# Patient Record
Sex: FEMALE | Race: BLACK OR AFRICAN AMERICAN | NOT HISPANIC OR LATINO | Employment: OTHER | RURAL
[De-identification: names, ages, dates, MRNs, and addresses within clinical notes are randomized per-mention and may not be internally consistent; named-entity substitution may affect disease eponyms.]

---

## 2021-11-19 DIAGNOSIS — M54.50 LOW BACK PAIN, UNSPECIFIED BACK PAIN LATERALITY, UNSPECIFIED CHRONICITY, UNSPECIFIED WHETHER SCIATICA PRESENT: Primary | ICD-10-CM

## 2021-11-22 ENCOUNTER — OFFICE VISIT (OUTPATIENT)
Dept: SPINE | Facility: CLINIC | Age: 76
End: 2021-11-22
Payer: MEDICARE

## 2021-11-22 ENCOUNTER — HOSPITAL ENCOUNTER (OUTPATIENT)
Dept: RADIOLOGY | Facility: HOSPITAL | Age: 76
Discharge: HOME | End: 2021-11-22
Attending: ORTHOPAEDIC SURGERY
Payer: MEDICARE

## 2021-11-22 VITALS — HEIGHT: 61 IN | BODY MASS INDEX: 21.71 KG/M2 | WEIGHT: 115 LBS

## 2021-11-22 DIAGNOSIS — M54.16 LUMBAR RADICULOPATHY, CHRONIC: ICD-10-CM

## 2021-11-22 DIAGNOSIS — M54.50 LOW BACK PAIN, UNSPECIFIED BACK PAIN LATERALITY, UNSPECIFIED CHRONICITY, UNSPECIFIED WHETHER SCIATICA PRESENT: ICD-10-CM

## 2021-11-22 PROCEDURE — 72110 X-RAY EXAM L-2 SPINE 4/>VWS: CPT | Mod: TC

## 2021-11-22 PROCEDURE — 99204 OFFICE O/P NEW MOD 45 MIN: CPT | Mod: S$PBB,,, | Performed by: ORTHOPAEDIC SURGERY

## 2021-11-22 PROCEDURE — 99204 PR OFFICE/OUTPT VISIT, NEW, LEVL IV, 45-59 MIN: ICD-10-PCS | Mod: S$PBB,,, | Performed by: ORTHOPAEDIC SURGERY

## 2021-11-22 PROCEDURE — 72110 X-RAY EXAM L-2 SPINE 4/>VWS: CPT | Mod: 26,,, | Performed by: ORTHOPAEDIC SURGERY

## 2021-11-22 PROCEDURE — 72110 XR LUMBAR SPINE 5 VIEW WITH FLEX AND EXT: ICD-10-PCS | Mod: 26,,, | Performed by: ORTHOPAEDIC SURGERY

## 2021-11-22 PROCEDURE — 99213 OFFICE O/P EST LOW 20 MIN: CPT | Mod: PBBFAC | Performed by: ORTHOPAEDIC SURGERY

## 2021-11-22 RX ORDER — TRAZODONE HYDROCHLORIDE 50 MG/1
TABLET ORAL
COMMUNITY
Start: 2021-11-13

## 2021-11-22 RX ORDER — ASPIRIN 81 MG/1
81 TABLET ORAL DAILY
COMMUNITY

## 2021-11-22 RX ORDER — DICLOFENAC SODIUM 10 MG/G
GEL TOPICAL
COMMUNITY
Start: 2021-10-16

## 2021-11-22 RX ORDER — IRBESARTAN AND HYDROCHLOROTHIAZIDE 300; 12.5 MG/1; MG/1
TABLET, FILM COATED ORAL
COMMUNITY
Start: 2021-09-13

## 2021-11-22 RX ORDER — LINAGLIPTIN 5 MG/1
TABLET, FILM COATED ORAL
COMMUNITY
Start: 2021-08-11 | End: 2022-03-16 | Stop reason: SDUPTHER

## 2021-11-22 RX ORDER — TIZANIDINE 4 MG/1
TABLET ORAL
COMMUNITY

## 2021-11-22 RX ORDER — METFORMIN HYDROCHLORIDE 500 MG/1
TABLET ORAL
COMMUNITY
Start: 2021-09-07 | End: 2022-03-16 | Stop reason: SDUPTHER

## 2021-11-22 RX ORDER — GABAPENTIN 600 MG/1
TABLET ORAL
COMMUNITY
Start: 2021-09-14

## 2021-11-22 RX ORDER — POTASSIUM CHLORIDE 20 MEQ/1
TABLET, EXTENDED RELEASE ORAL
COMMUNITY
Start: 2021-11-12

## 2021-11-22 RX ORDER — CLOBETASOL PROPIONATE 0.5 MG/G
OINTMENT TOPICAL
COMMUNITY
Start: 2021-11-15

## 2021-11-22 RX ORDER — PANTOPRAZOLE SODIUM 40 MG/1
TABLET, DELAYED RELEASE ORAL
COMMUNITY
Start: 2021-08-26

## 2021-11-22 RX ORDER — GLIPIZIDE 10 MG/1
TABLET, FILM COATED, EXTENDED RELEASE ORAL
COMMUNITY
Start: 2021-11-12

## 2021-11-22 RX ORDER — AMLODIPINE BESYLATE 5 MG/1
TABLET ORAL
COMMUNITY
Start: 2021-08-26 | End: 2022-03-16

## 2021-11-22 RX ORDER — SIMVASTATIN 20 MG/1
TABLET, FILM COATED ORAL
COMMUNITY
Start: 2021-08-05

## 2021-11-23 DIAGNOSIS — M54.16 LUMBAR RADICULOPATHY, CHRONIC: ICD-10-CM

## 2022-03-02 ENCOUNTER — OFFICE VISIT (OUTPATIENT)
Dept: FAMILY MEDICINE | Facility: CLINIC | Age: 77
End: 2022-03-02
Payer: MEDICARE

## 2022-03-02 VITALS
DIASTOLIC BLOOD PRESSURE: 80 MMHG | HEIGHT: 61 IN | WEIGHT: 112 LBS | SYSTOLIC BLOOD PRESSURE: 171 MMHG | RESPIRATION RATE: 18 BRPM | TEMPERATURE: 98 F | HEART RATE: 85 BPM | BODY MASS INDEX: 21.14 KG/M2 | OXYGEN SATURATION: 99 %

## 2022-03-02 DIAGNOSIS — I10 PRIMARY HYPERTENSION: Primary | ICD-10-CM

## 2022-03-02 DIAGNOSIS — K21.9 GASTROESOPHAGEAL REFLUX DISEASE WITHOUT ESOPHAGITIS: ICD-10-CM

## 2022-03-02 DIAGNOSIS — E11.9 TYPE 2 DIABETES MELLITUS WITHOUT COMPLICATION, WITH LONG-TERM CURRENT USE OF INSULIN: ICD-10-CM

## 2022-03-02 DIAGNOSIS — Z79.4 TYPE 2 DIABETES MELLITUS WITHOUT COMPLICATION, WITH LONG-TERM CURRENT USE OF INSULIN: ICD-10-CM

## 2022-03-02 DIAGNOSIS — E78.49 OTHER HYPERLIPIDEMIA: ICD-10-CM

## 2022-03-02 DIAGNOSIS — M17.0 PRIMARY OSTEOARTHRITIS OF BOTH KNEES: ICD-10-CM

## 2022-03-02 DIAGNOSIS — M79.7 FIBROMYALGIA: ICD-10-CM

## 2022-03-02 LAB
ANION GAP SERPL CALCULATED.3IONS-SCNC: 7 MMOL/L (ref 7–16)
BUN SERPL-MCNC: 22 MG/DL (ref 7–18)
BUN/CREAT SERPL: 24 (ref 6–20)
CALCIUM SERPL-MCNC: 10.3 MG/DL (ref 8.5–10.1)
CHLORIDE SERPL-SCNC: 106 MMOL/L (ref 98–107)
CO2 SERPL-SCNC: 32 MMOL/L (ref 21–32)
CREAT SERPL-MCNC: 0.9 MG/DL (ref 0.55–1.02)
CREAT UR-MCNC: 70 MG/DL (ref 28–219)
EST. AVERAGE GLUCOSE BLD GHB EST-MCNC: 124 MG/DL
GLUCOSE SERPL-MCNC: 130 MG/DL (ref 74–106)
HBA1C MFR BLD HPLC: 6.3 % (ref 4.5–6.6)
MICROALBUMIN UR-MCNC: 48 MG/DL (ref 0–2.8)
MICROALBUMIN/CREAT RATIO PNL UR: 685.7 MG/G (ref 0–30)
POTASSIUM SERPL-SCNC: 4.4 MMOL/L (ref 3.5–5.1)
PROT UR-MCNC: 64.9 MG/DL (ref 0–11.9)
SODIUM SERPL-SCNC: 141 MMOL/L (ref 136–145)

## 2022-03-02 PROCEDURE — 82570 ASSAY OF URINE CREATININE: CPT | Mod: ,,, | Performed by: CLINICAL MEDICAL LABORATORY

## 2022-03-02 PROCEDURE — 80048 BASIC METABOLIC PANEL: ICD-10-PCS | Mod: ,,, | Performed by: CLINICAL MEDICAL LABORATORY

## 2022-03-02 PROCEDURE — 99204 OFFICE O/P NEW MOD 45 MIN: CPT | Mod: ,,, | Performed by: INTERNAL MEDICINE

## 2022-03-02 PROCEDURE — 83036 HEMOGLOBIN GLYCOSYLATED A1C: CPT | Mod: ,,, | Performed by: CLINICAL MEDICAL LABORATORY

## 2022-03-02 PROCEDURE — 83036 HEMOGLOBIN A1C: ICD-10-PCS | Mod: ,,, | Performed by: CLINICAL MEDICAL LABORATORY

## 2022-03-02 PROCEDURE — 82043 MICROALBUMIN / CREATININE RATIO URINE: ICD-10-PCS | Mod: ,,, | Performed by: CLINICAL MEDICAL LABORATORY

## 2022-03-02 PROCEDURE — 82570 MICROALBUMIN / CREATININE RATIO URINE: ICD-10-PCS | Mod: ,,, | Performed by: CLINICAL MEDICAL LABORATORY

## 2022-03-02 PROCEDURE — 84156 ASSAY OF PROTEIN URINE: CPT | Mod: ,,, | Performed by: CLINICAL MEDICAL LABORATORY

## 2022-03-02 PROCEDURE — 82043 UR ALBUMIN QUANTITATIVE: CPT | Mod: ,,, | Performed by: CLINICAL MEDICAL LABORATORY

## 2022-03-02 PROCEDURE — 99204 PR OFFICE/OUTPT VISIT, NEW, LEVL IV, 45-59 MIN: ICD-10-PCS | Mod: ,,, | Performed by: INTERNAL MEDICINE

## 2022-03-02 PROCEDURE — 84156 PROTEIN, QUANTITATIVE, URINE RANDOM: ICD-10-PCS | Mod: ,,, | Performed by: CLINICAL MEDICAL LABORATORY

## 2022-03-02 PROCEDURE — 80048 BASIC METABOLIC PNL TOTAL CA: CPT | Mod: ,,, | Performed by: CLINICAL MEDICAL LABORATORY

## 2022-03-02 RX ORDER — MELOXICAM 7.5 MG/1
7.5 TABLET ORAL DAILY
COMMUNITY
Start: 2022-02-23

## 2022-03-03 NOTE — PROGRESS NOTES
Subjective:       Patient ID: Flako Abdul is a 76 y.o. female.    Chief Complaint: Diabetes, Hypertension, and Establish Care    Patient is here today for new patient evaluation. Patient has history of hypertension, neuropathy, diabetes, GERD, fibromyalgia, and chronic lower back pain. Patient is former Dr Quinn patient. Patient states she has severe back pain from DDD and multiple procedures. Patient follows with pain clinic and receives injections. Patient has pain on palpation of lower back. Patient follows with Dr Kerr. Patient pressure is increased today and states she did take medications. Will order baseline lab work. Will follow in 2 weeks.       Current Medications:    Current Outpatient Medications:     amLODIPine (NORVASC) 5 MG tablet, , Disp: , Rfl:     aspirin (ECOTRIN) 81 MG EC tablet, Take 81 mg by mouth once daily., Disp: , Rfl:     gabapentin (NEURONTIN) 600 MG tablet, , Disp: , Rfl:     glipiZIDE (GLUCOTROL) 10 MG TR24, , Disp: , Rfl:     irbesartan-hydrochlorothiazide (AVALIDE) 300-12.5 mg per tablet, , Disp: , Rfl:     meloxicam (MOBIC) 7.5 MG tablet, Take 7.5 mg by mouth once daily., Disp: , Rfl:     metFORMIN (GLUCOPHAGE) 500 MG tablet, , Disp: , Rfl:     potassium chloride SA (K-DUR,KLOR-CON) 20 MEQ tablet, , Disp: , Rfl:     simvastatin (ZOCOR) 20 MG tablet, , Disp: , Rfl:     TRADJENTA 5 mg Tab tablet, , Disp: , Rfl:     traZODone (DESYREL) 50 MG tablet, , Disp: , Rfl:     clobetasol 0.05% (TEMOVATE) 0.05 % Oint, , Disp: , Rfl:     diclofenac sodium (VOLTAREN) 1 % Gel, , Disp: , Rfl:     pantoprazole (PROTONIX) 40 MG tablet, , Disp: , Rfl:     tiZANidine (ZANAFLEX) 4 MG tablet, tizanidine 4 mg tablet  Take 1 tablet every 8 hours by oral route., Disp: , Rfl:     Last Labs:     Office Visit on 03/02/2022   Component Date Value    Sodium 03/02/2022 141     Potassium 03/02/2022 4.4     Chloride 03/02/2022 106     CO2 03/02/2022 32     Anion Gap 03/02/2022 7     Glucose  03/02/2022 130 (A)    BUN 03/02/2022 22 (A)    Creatinine 03/02/2022 0.90     BUN/Creatinine Ratio 03/02/2022 24 (A)    Calcium 03/02/2022 10.3 (A)    eGFR 03/02/2022 65     Hemoglobin A1C 03/02/2022 6.3     Estimated Average Glucose 03/02/2022 124     Creatinine, Urine 03/02/2022 70     Microalbumin 03/02/2022 48.0 (A)    Microalbumin/Creatinine * 03/02/2022 685.7 (A)    Protein, Urine 03/02/2022 64.9 (A)       Last Imaging:  X-Ray Lumbar Complete Including Flex And Ext  See Procedure Notes for results.     IMPRESSION: Please see Ortho procedure notes for report.      This procedure was auto-finalized by: Virtual Radiologist         Review of Systems   Musculoskeletal: Positive for arthralgias, back pain and myalgias.   All other systems reviewed and are negative.        Objective:      Physical Exam  Vitals reviewed.   Constitutional:       Appearance: Normal appearance.   Cardiovascular:      Rate and Rhythm: Normal rate and regular rhythm.      Pulses: Normal pulses.      Heart sounds: Normal heart sounds.   Pulmonary:      Effort: Pulmonary effort is normal.      Breath sounds: Normal breath sounds.   Abdominal:      General: Abdomen is flat. Bowel sounds are normal.      Palpations: Abdomen is soft.   Musculoskeletal:         General: Normal range of motion.      Cervical back: Normal range of motion and neck supple.   Skin:     General: Skin is warm and dry.   Neurological:      General: No focal deficit present.      Mental Status: She is alert and oriented to person, place, and time. Mental status is at baseline.         Assessment:       1. Primary hypertension  Basic Metabolic Panel    Microalbumin/Creatinine Ratio, Urine    Protein, Random Urine    Basic Metabolic Panel    Microalbumin/Creatinine Ratio, Urine    Protein, Random Urine   2. Type 2 diabetes mellitus without complication, with long-term current use of insulin  Hemoglobin A1C    Hemoglobin A1C   3. Fibromyalgia     4. Other  hyperlipidemia     5. Gastroesophageal reflux disease without esophagitis     6. Primary osteoarthritis of both knees          Plan:         Flako was seen today for diabetes, hypertension and establish care.    Diagnoses and all orders for this visit:    Primary hypertension  -     Basic Metabolic Panel; Future  -     Microalbumin/Creatinine Ratio, Urine; Future  -     Protein, Random Urine; Future  -     Basic Metabolic Panel  -     Microalbumin/Creatinine Ratio, Urine  -     Protein, Random Urine    Type 2 diabetes mellitus without complication, with long-term current use of insulin  -     Hemoglobin A1C; Future  -     Hemoglobin A1C    Fibromyalgia    Other hyperlipidemia    Gastroesophageal reflux disease without esophagitis    Primary osteoarthritis of both knees

## 2022-03-03 NOTE — PATIENT INSTRUCTIONS
Flako was seen today for diabetes, hypertension and establish care.    Diagnoses and all orders for this visit:    Primary hypertension  -     Basic Metabolic Panel; Future  -     Microalbumin/Creatinine Ratio, Urine; Future  -     Protein, Random Urine; Future  -     Basic Metabolic Panel  -     Microalbumin/Creatinine Ratio, Urine  -     Protein, Random Urine    Type 2 diabetes mellitus without complication, with long-term current use of insulin  -     Hemoglobin A1C; Future  -     Hemoglobin A1C    Fibromyalgia    Other hyperlipidemia    Gastroesophageal reflux disease without esophagitis    Primary osteoarthritis of both knees

## 2022-03-16 ENCOUNTER — OFFICE VISIT (OUTPATIENT)
Dept: FAMILY MEDICINE | Facility: CLINIC | Age: 77
End: 2022-03-16
Payer: MEDICARE

## 2022-03-16 VITALS
OXYGEN SATURATION: 100 % | HEART RATE: 65 BPM | DIASTOLIC BLOOD PRESSURE: 68 MMHG | RESPIRATION RATE: 18 BRPM | BODY MASS INDEX: 21.34 KG/M2 | WEIGHT: 113 LBS | SYSTOLIC BLOOD PRESSURE: 168 MMHG | HEIGHT: 61 IN | TEMPERATURE: 97 F

## 2022-03-16 DIAGNOSIS — G89.4 CHRONIC PAIN SYNDROME: ICD-10-CM

## 2022-03-16 DIAGNOSIS — M47.892 OTHER OSTEOARTHRITIS OF SPINE, CERVICAL REGION: ICD-10-CM

## 2022-03-16 DIAGNOSIS — R80.8 OTHER PROTEINURIA: ICD-10-CM

## 2022-03-16 DIAGNOSIS — I10 PRIMARY HYPERTENSION: Primary | ICD-10-CM

## 2022-03-16 DIAGNOSIS — E11.9 TYPE 2 DIABETES MELLITUS WITHOUT COMPLICATION, WITH LONG-TERM CURRENT USE OF INSULIN: ICD-10-CM

## 2022-03-16 DIAGNOSIS — M54.2 CERVICAL PAIN: ICD-10-CM

## 2022-03-16 DIAGNOSIS — Z79.4 TYPE 2 DIABETES MELLITUS WITHOUT COMPLICATION, WITH LONG-TERM CURRENT USE OF INSULIN: ICD-10-CM

## 2022-03-16 PROCEDURE — 99214 OFFICE O/P EST MOD 30 MIN: CPT | Mod: ,,, | Performed by: INTERNAL MEDICINE

## 2022-03-16 PROCEDURE — 99214 PR OFFICE/OUTPT VISIT, EST, LEVL IV, 30-39 MIN: ICD-10-PCS | Mod: ,,, | Performed by: INTERNAL MEDICINE

## 2022-03-16 RX ORDER — LINAGLIPTIN 5 MG/1
5 TABLET, FILM COATED ORAL DAILY
Qty: 90 TABLET | Refills: 1 | Status: SHIPPED | OUTPATIENT
Start: 2022-03-16

## 2022-03-16 RX ORDER — AMLODIPINE BESYLATE 10 MG/1
10 TABLET ORAL EVERY MORNING
Qty: 90 TABLET | Refills: 1 | Status: SHIPPED | OUTPATIENT
Start: 2022-03-16 | End: 2022-09-12

## 2022-03-16 RX ORDER — METFORMIN HYDROCHLORIDE 500 MG/1
500 TABLET ORAL
Qty: 90 TABLET | Refills: 1 | Status: SHIPPED | OUTPATIENT
Start: 2022-03-16

## 2022-03-21 NOTE — PROGRESS NOTES
Subjective:       Patient ID: Flako Abdul is a 76 y.o. female.    Chief Complaint: Follow-up (2 weeks)    Patient is here today for check up evaluation. Patient pressure is increased and states she did take her medications today. Patient feels pressure is increased due to her chronic pain of her neck and back. States follows with patient clinic. Patient states her neck pain is severe and feels she needs a neck brace. Will refer to Dr Kerr and refer for PT. Will increase Norvasc to 10 mg PO qAM and follow in 1 month. Recent labs reviewed and shows A1C at 6.3 and BMP showed dehydration. Advised patient to increase water intake. Recommended OTC extra strength tylenol for pain.       Current Medications:    Current Outpatient Medications:     aspirin (ECOTRIN) 81 MG EC tablet, Take 81 mg by mouth once daily., Disp: , Rfl:     clobetasol 0.05% (TEMOVATE) 0.05 % Oint, , Disp: , Rfl:     diclofenac sodium (VOLTAREN) 1 % Gel, , Disp: , Rfl:     gabapentin (NEURONTIN) 600 MG tablet, , Disp: , Rfl:     glipiZIDE (GLUCOTROL) 10 MG TR24, , Disp: , Rfl:     irbesartan-hydrochlorothiazide (AVALIDE) 300-12.5 mg per tablet, , Disp: , Rfl:     meloxicam (MOBIC) 7.5 MG tablet, Take 7.5 mg by mouth once daily., Disp: , Rfl:     pantoprazole (PROTONIX) 40 MG tablet, , Disp: , Rfl:     potassium chloride SA (K-DUR,KLOR-CON) 20 MEQ tablet, , Disp: , Rfl:     simvastatin (ZOCOR) 20 MG tablet, , Disp: , Rfl:     tiZANidine (ZANAFLEX) 4 MG tablet, tizanidine 4 mg tablet  Take 1 tablet every 8 hours by oral route., Disp: , Rfl:     traZODone (DESYREL) 50 MG tablet, , Disp: , Rfl:     amLODIPine (NORVASC) 10 MG tablet, Take 1 tablet (10 mg total) by mouth every morning., Disp: 90 tablet, Rfl: 1    metFORMIN (GLUCOPHAGE) 500 MG tablet, Take 1 tablet (500 mg total) by mouth daily with breakfast., Disp: 90 tablet, Rfl: 1    TRADJENTA 5 mg Tab tablet, Take 1 tablet (5 mg total) by mouth once daily., Disp: 90 tablet, Rfl:  1    Last Labs:     Office Visit on 03/02/2022   Component Date Value    Sodium 03/02/2022 141     Potassium 03/02/2022 4.4     Chloride 03/02/2022 106     CO2 03/02/2022 32     Anion Gap 03/02/2022 7     Glucose 03/02/2022 130 (A)    BUN 03/02/2022 22 (A)    Creatinine 03/02/2022 0.90     BUN/Creatinine Ratio 03/02/2022 24 (A)    Calcium 03/02/2022 10.3 (A)    eGFR 03/02/2022 65     Hemoglobin A1C 03/02/2022 6.3     Estimated Average Glucose 03/02/2022 124     Creatinine, Urine 03/02/2022 70     Microalbumin 03/02/2022 48.0 (A)    Microalbumin/Creatinine * 03/02/2022 685.7 (A)    Protein, Urine 03/02/2022 64.9 (A)       Last Imaging:  X-Ray Lumbar Complete Including Flex And Ext  See Procedure Notes for results.     IMPRESSION: Please see Ortho procedure notes for report.      This procedure was auto-finalized by: Virtual Radiologist         Review of Systems   Musculoskeletal: Positive for arthralgias, back pain and neck pain.         Objective:      Physical Exam  Vitals reviewed.   Constitutional:       Appearance: Normal appearance.   Cardiovascular:      Rate and Rhythm: Normal rate and regular rhythm.      Pulses: Normal pulses.      Heart sounds: Normal heart sounds.   Pulmonary:      Effort: Pulmonary effort is normal.      Breath sounds: Normal breath sounds.   Abdominal:      General: Abdomen is flat. Bowel sounds are normal.      Palpations: Abdomen is soft.   Musculoskeletal:         General: Normal range of motion.      Cervical back: Normal range of motion and neck supple.   Skin:     General: Skin is warm and dry.   Neurological:      General: No focal deficit present.      Mental Status: She is alert and oriented to person, place, and time. Mental status is at baseline.         Assessment:       1. Primary hypertension     2. Cervical pain  Ambulatory referral/consult to [facility]    Ambulatory referral/consult to Physical/Occupational Therapy   3. Chronic pain syndrome      4. Type 2 diabetes mellitus without complication, with long-term current use of insulin     5. Other proteinuria     6. Other osteoarthritis of spine, cervical region          Plan:         Flako was seen today for follow-up.    Diagnoses and all orders for this visit:    Primary hypertension    Cervical pain  -     Ambulatory referral/consult to [facility]; Future  -     Ambulatory referral/consult to Physical/Occupational Therapy; Future    Chronic pain syndrome    Type 2 diabetes mellitus without complication, with long-term current use of insulin    Other proteinuria    Other osteoarthritis of spine, cervical region    Other orders  -     metFORMIN (GLUCOPHAGE) 500 MG tablet; Take 1 tablet (500 mg total) by mouth daily with breakfast.  -     TRADJENTA 5 mg Tab tablet; Take 1 tablet (5 mg total) by mouth once daily.  -     amLODIPine (NORVASC) 10 MG tablet; Take 1 tablet (10 mg total) by mouth every morning.

## 2022-03-21 NOTE — PATIENT INSTRUCTIONS
Flako was seen today for follow-up.    Diagnoses and all orders for this visit:    Primary hypertension    Cervical pain  -     Ambulatory referral/consult to [facility]; Future  -     Ambulatory referral/consult to Physical/Occupational Therapy; Future    Chronic pain syndrome    Type 2 diabetes mellitus without complication, with long-term current use of insulin    Other proteinuria    Other osteoarthritis of spine, cervical region    Other orders  -     metFORMIN (GLUCOPHAGE) 500 MG tablet; Take 1 tablet (500 mg total) by mouth daily with breakfast.  -     TRADJENTA 5 mg Tab tablet; Take 1 tablet (5 mg total) by mouth once daily.  -     amLODIPine (NORVASC) 10 MG tablet; Take 1 tablet (10 mg total) by mouth every morning.

## 2024-02-19 ENCOUNTER — OV EP (OUTPATIENT)
Dept: URBAN - METROPOLITAN AREA CLINIC 25 | Facility: CLINIC | Age: 79
End: 2024-02-19

## 2024-02-19 ENCOUNTER — LAB (OUTPATIENT)
Dept: URBAN - METROPOLITAN AREA CLINIC 25 | Facility: CLINIC | Age: 79
End: 2024-02-19

## 2024-02-19 VITALS
HEIGHT: 60 IN | SYSTOLIC BLOOD PRESSURE: 144 MMHG | BODY MASS INDEX: 23.95 KG/M2 | HEART RATE: 75 BPM | TEMPERATURE: 97.5 F | WEIGHT: 122 LBS | DIASTOLIC BLOOD PRESSURE: 82 MMHG

## 2024-02-19 PROBLEM — 14760008: Status: ACTIVE | Noted: 2024-02-19

## 2024-02-19 PROBLEM — 55822004: Status: ACTIVE | Noted: 2024-02-19

## 2024-02-19 PROBLEM — 79890006: Status: ACTIVE | Noted: 2024-02-19

## 2024-02-19 PROBLEM — 44054006: Status: ACTIVE | Noted: 2024-02-19

## 2024-02-19 PROBLEM — 38341003: Status: ACTIVE | Noted: 2024-02-19

## 2024-02-19 RX ORDER — GLIPIZIDE 10 MG/1
1 TABLET WITH BREAKFAST TABLET, FILM COATED, EXTENDED RELEASE ORAL ONCE A DAY
Status: ACTIVE | COMMUNITY

## 2024-02-19 RX ORDER — AMLODIPINE BESYLATE 10 MG/1
TAKE 1 TABLET BY MOUTH ONCE DAILY TABLET ORAL
Qty: 90 EACH | Refills: 0 | Status: ACTIVE | COMMUNITY

## 2024-02-19 RX ORDER — PANTOPRAZOLE SODIUM 20 MG/1
1 TABLET TABLET, DELAYED RELEASE ORAL ONCE A DAY
Status: ACTIVE | COMMUNITY

## 2024-02-19 RX ORDER — AZELASTINE 1 MG/ML
SPRAY, METERED NASAL
Qty: 30 MILLILITER | Status: ACTIVE | COMMUNITY

## 2024-02-19 RX ORDER — GABAPENTIN 600 MG/1
1 TABLET TABLET, FILM COATED ORAL ONCE A DAY
Qty: 30 | Status: ACTIVE | COMMUNITY
Start: 2024-02-19

## 2024-02-19 RX ORDER — METFORMIN HYDROCHLORIDE 500 MG/1
1 TABLET WITH A MEAL TABLET, FILM COATED ORAL ONCE A DAY
Status: ACTIVE | COMMUNITY

## 2024-02-19 RX ORDER — LINAGLIPTIN 5 MG/1
1 TABLET TABLET, FILM COATED ORAL ONCE A DAY
Status: ACTIVE | COMMUNITY

## 2024-02-19 RX ORDER — VALSARTAN AND HYDROCHLOROTHIAZIDE 320; 12.5 MG/1; MG/1
1 TABLET TABLET, FILM COATED ORAL ONCE A DAY
Status: ACTIVE | COMMUNITY

## 2024-02-19 RX ORDER — SIMVASTATIN 20 MG
1 TABLET IN THE EVENING TABLET ORAL ONCE A DAY
Status: ACTIVE | COMMUNITY

## 2024-02-19 RX ORDER — LIDOCAINE 50 MG/G
PATCH TOPICAL
Qty: 60 PATCH | Status: ACTIVE | COMMUNITY

## 2024-02-19 RX ORDER — POTASSIUM CHLORIDE 750 MG/1
TAKE 1 TABLET BY MOUTH ONCE DAILY TABLET, EXTENDED RELEASE ORAL
Qty: 90 EACH | Refills: 0 | Status: ACTIVE | COMMUNITY

## 2024-02-21 ENCOUNTER — EGD (OUTPATIENT)
Dept: URBAN - METROPOLITAN AREA SURGERY CENTER 20 | Facility: SURGERY CENTER | Age: 79
End: 2024-02-21

## 2024-02-21 ENCOUNTER — LAB (OUTPATIENT)
Dept: URBAN - METROPOLITAN AREA CLINIC 4 | Facility: CLINIC | Age: 79
End: 2024-02-21

## 2024-02-21 DIAGNOSIS — K29.70 GASTRITIS, UNSPECIFIED, WITHOUT BLEEDING: ICD-10-CM

## 2024-02-21 PROCEDURE — 88305 TISSUE EXAM BY PATHOLOGIST: CPT | Performed by: PATHOLOGY

## 2024-02-21 PROCEDURE — 88342 IMHCHEM/IMCYTCHM 1ST ANTB: CPT | Performed by: PATHOLOGY

## 2024-02-21 PROCEDURE — 88341 IMHCHEM/IMCYTCHM EA ADD ANTB: CPT | Performed by: PATHOLOGY

## 2024-02-21 RX ORDER — AZELASTINE 1 MG/ML
SPRAY, METERED NASAL
Qty: 30 MILLILITER | Status: ACTIVE | COMMUNITY

## 2024-02-21 RX ORDER — POTASSIUM CHLORIDE 750 MG/1
TAKE 1 TABLET BY MOUTH ONCE DAILY TABLET, EXTENDED RELEASE ORAL
Qty: 90 EACH | Refills: 0 | Status: ACTIVE | COMMUNITY

## 2024-02-21 RX ORDER — AMLODIPINE BESYLATE 10 MG/1
TAKE 1 TABLET BY MOUTH ONCE DAILY TABLET ORAL
Qty: 90 EACH | Refills: 0 | Status: ACTIVE | COMMUNITY

## 2024-02-21 RX ORDER — GABAPENTIN 600 MG/1
1 TABLET TABLET, FILM COATED ORAL ONCE A DAY
Qty: 30 | Status: ACTIVE | COMMUNITY
Start: 2024-02-19

## 2024-02-21 RX ORDER — GLIPIZIDE 10 MG/1
1 TABLET WITH BREAKFAST TABLET, FILM COATED, EXTENDED RELEASE ORAL ONCE A DAY
Status: ACTIVE | COMMUNITY

## 2024-02-21 RX ORDER — VALSARTAN AND HYDROCHLOROTHIAZIDE 320; 12.5 MG/1; MG/1
1 TABLET TABLET, FILM COATED ORAL ONCE A DAY
Status: ACTIVE | COMMUNITY

## 2024-02-21 RX ORDER — SIMVASTATIN 20 MG
1 TABLET IN THE EVENING TABLET ORAL ONCE A DAY
Status: ACTIVE | COMMUNITY

## 2024-02-21 RX ORDER — LINAGLIPTIN 5 MG/1
1 TABLET TABLET, FILM COATED ORAL ONCE A DAY
Status: ACTIVE | COMMUNITY

## 2024-02-21 RX ORDER — METFORMIN HYDROCHLORIDE 500 MG/1
1 TABLET WITH A MEAL TABLET, FILM COATED ORAL ONCE A DAY
Status: ACTIVE | COMMUNITY

## 2024-02-21 RX ORDER — LIDOCAINE 50 MG/G
PATCH TOPICAL
Qty: 60 PATCH | Status: ACTIVE | COMMUNITY

## 2024-02-21 RX ORDER — PANTOPRAZOLE SODIUM 20 MG/1
1 TABLET TABLET, DELAYED RELEASE ORAL ONCE A DAY
Status: ACTIVE | COMMUNITY

## 2024-03-08 ENCOUNTER — COLON (OUTPATIENT)
Dept: URBAN - METROPOLITAN AREA SURGERY CENTER 20 | Facility: SURGERY CENTER | Age: 79
End: 2024-03-08

## 2024-03-19 ENCOUNTER — OV EP (OUTPATIENT)
Dept: URBAN - METROPOLITAN AREA CLINIC 84 | Facility: CLINIC | Age: 79
End: 2024-03-19

## 2024-03-19 RX ORDER — PANTOPRAZOLE SODIUM 20 MG/1
1 TABLET TABLET, DELAYED RELEASE ORAL ONCE A DAY
COMMUNITY

## 2024-03-19 RX ORDER — LINAGLIPTIN 5 MG/1
1 TABLET TABLET, FILM COATED ORAL ONCE A DAY
COMMUNITY

## 2024-03-19 RX ORDER — LIDOCAINE 50 MG/G
PATCH TOPICAL
Qty: 60 PATCH | COMMUNITY

## 2024-03-19 RX ORDER — POTASSIUM CHLORIDE 750 MG/1
TAKE 1 TABLET BY MOUTH ONCE DAILY TABLET, EXTENDED RELEASE ORAL
Qty: 90 EACH | Refills: 0 | COMMUNITY

## 2024-03-19 RX ORDER — METFORMIN HYDROCHLORIDE 500 MG/1
1 TABLET WITH A MEAL TABLET, FILM COATED ORAL ONCE A DAY
COMMUNITY

## 2024-03-19 RX ORDER — AZELASTINE 1 MG/ML
SPRAY, METERED NASAL
Qty: 30 MILLILITER | COMMUNITY

## 2024-03-19 RX ORDER — SIMVASTATIN 20 MG
1 TABLET IN THE EVENING TABLET ORAL ONCE A DAY
COMMUNITY

## 2024-03-19 RX ORDER — GLIPIZIDE 10 MG/1
1 TABLET WITH BREAKFAST TABLET, FILM COATED, EXTENDED RELEASE ORAL ONCE A DAY
COMMUNITY

## 2024-03-19 RX ORDER — AMLODIPINE BESYLATE 10 MG/1
TAKE 1 TABLET BY MOUTH ONCE DAILY TABLET ORAL
Qty: 90 EACH | Refills: 0 | COMMUNITY

## 2024-03-19 RX ORDER — GABAPENTIN 600 MG/1
1 TABLET TABLET, FILM COATED ORAL ONCE A DAY
Qty: 30 | COMMUNITY
Start: 2024-02-19

## 2024-03-19 RX ORDER — VALSARTAN AND HYDROCHLOROTHIAZIDE 320; 12.5 MG/1; MG/1
1 TABLET TABLET, FILM COATED ORAL ONCE A DAY
COMMUNITY

## 2024-04-12 ENCOUNTER — COLON (OUTPATIENT)
Dept: URBAN - METROPOLITAN AREA SURGERY CENTER 20 | Facility: SURGERY CENTER | Age: 79
End: 2024-04-12
Payer: MEDICARE

## 2024-04-12 DIAGNOSIS — K59.00 ACUTE CONSTIPATION: ICD-10-CM

## 2024-04-12 DIAGNOSIS — R63.4 WEIGHT LOSS: ICD-10-CM

## 2024-04-12 PROCEDURE — 45378 DIAGNOSTIC COLONOSCOPY: CPT | Performed by: INTERNAL MEDICINE

## 2024-04-12 RX ORDER — POTASSIUM CHLORIDE 750 MG/1
TAKE 1 TABLET BY MOUTH ONCE DAILY TABLET, EXTENDED RELEASE ORAL
Qty: 90 EACH | Refills: 0 | COMMUNITY

## 2024-04-12 RX ORDER — LIDOCAINE 50 MG/G
PATCH TOPICAL
Qty: 60 PATCH | COMMUNITY

## 2024-04-12 RX ORDER — METFORMIN HYDROCHLORIDE 500 MG/1
1 TABLET WITH A MEAL TABLET, FILM COATED ORAL ONCE A DAY
COMMUNITY

## 2024-04-12 RX ORDER — VALSARTAN AND HYDROCHLOROTHIAZIDE 320; 12.5 MG/1; MG/1
1 TABLET TABLET, FILM COATED ORAL ONCE A DAY
COMMUNITY

## 2024-04-12 RX ORDER — SIMVASTATIN 20 MG
1 TABLET IN THE EVENING TABLET ORAL ONCE A DAY
COMMUNITY

## 2024-04-12 RX ORDER — AMLODIPINE BESYLATE 10 MG/1
TAKE 1 TABLET BY MOUTH ONCE DAILY TABLET ORAL
Qty: 90 EACH | Refills: 0 | COMMUNITY

## 2024-04-12 RX ORDER — PANTOPRAZOLE SODIUM 20 MG/1
1 TABLET TABLET, DELAYED RELEASE ORAL ONCE A DAY
COMMUNITY

## 2024-04-12 RX ORDER — GABAPENTIN 600 MG/1
1 TABLET TABLET, FILM COATED ORAL ONCE A DAY
Qty: 30 | COMMUNITY
Start: 2024-02-19

## 2024-04-12 RX ORDER — AZELASTINE 1 MG/ML
SPRAY, METERED NASAL
Qty: 30 MILLILITER | COMMUNITY

## 2024-04-12 RX ORDER — LINAGLIPTIN 5 MG/1
1 TABLET TABLET, FILM COATED ORAL ONCE A DAY
COMMUNITY

## 2024-04-12 RX ORDER — GLIPIZIDE 10 MG/1
1 TABLET WITH BREAKFAST TABLET, FILM COATED, EXTENDED RELEASE ORAL ONCE A DAY
COMMUNITY

## 2024-12-27 ENCOUNTER — OFFICE VISIT (OUTPATIENT)
Dept: URBAN - METROPOLITAN AREA CLINIC 84 | Facility: CLINIC | Age: 79
End: 2024-12-27

## 2024-12-27 ENCOUNTER — DASHBOARD ENCOUNTERS (OUTPATIENT)
Age: 79
End: 2024-12-27

## 2024-12-27 VITALS
HEIGHT: 60 IN | SYSTOLIC BLOOD PRESSURE: 153 MMHG | BODY MASS INDEX: 24.46 KG/M2 | WEIGHT: 124.6 LBS | DIASTOLIC BLOOD PRESSURE: 70 MMHG | HEART RATE: 89 BPM | TEMPERATURE: 97.2 F

## 2024-12-27 RX ORDER — PANTOPRAZOLE SODIUM 20 MG/1
1 TABLET TABLET, DELAYED RELEASE ORAL ONCE A DAY
Status: ACTIVE | COMMUNITY

## 2024-12-27 RX ORDER — SIMVASTATIN 20 MG
1 TABLET IN THE EVENING TABLET ORAL ONCE A DAY
Status: ACTIVE | COMMUNITY

## 2024-12-27 RX ORDER — POTASSIUM CHLORIDE 750 MG/1
TAKE 1 TABLET BY MOUTH ONCE DAILY TABLET, EXTENDED RELEASE ORAL
Qty: 90 EACH | Refills: 0 | Status: ACTIVE | COMMUNITY

## 2024-12-27 RX ORDER — METFORMIN HYDROCHLORIDE 500 MG/1
1 TABLET WITH A MEAL TABLET, FILM COATED ORAL ONCE A DAY
Status: ACTIVE | COMMUNITY

## 2024-12-27 RX ORDER — LIDOCAINE 50 MG/G
PATCH TOPICAL
Qty: 60 PATCH | Status: ACTIVE | COMMUNITY

## 2024-12-27 RX ORDER — CLOBETASOL PROPIONATE OINTMENT USP, 0.05% 0.5 MG/G
OINTMENT TOPICAL
Qty: 60 GRAM | Status: ACTIVE | COMMUNITY

## 2024-12-27 RX ORDER — PREGABALIN 100 MG/1
CAPSULE ORAL
Qty: 60 CAPSULE | Status: ACTIVE | COMMUNITY

## 2024-12-27 RX ORDER — AZELASTINE 1 MG/ML
SPRAY, METERED NASAL
Qty: 30 MILLILITER | Status: ACTIVE | COMMUNITY

## 2024-12-27 RX ORDER — METFORMIN HYDROCHLORIDE 500 MG/1
TABLET ORAL
Qty: 180 UNSPECIFIED | Status: ACTIVE | COMMUNITY

## 2024-12-27 RX ORDER — LINAGLIPTIN 5 MG/1
TAKE 1 TABLET BY MOUTH ONCE DAILY TABLET, FILM COATED ORAL
Qty: 90 EACH | Refills: 1 | Status: ACTIVE | COMMUNITY

## 2024-12-27 RX ORDER — LINAGLIPTIN 5 MG/1
1 TABLET TABLET, FILM COATED ORAL ONCE A DAY
Status: ACTIVE | COMMUNITY

## 2024-12-27 RX ORDER — AMLODIPINE BESYLATE 10 MG/1
TAKE 1 TABLET BY MOUTH ONCE DAILY TABLET ORAL
Qty: 90 EACH | Refills: 0 | Status: ACTIVE | COMMUNITY

## 2024-12-27 RX ORDER — GLIPIZIDE 10 MG/1
1 TABLET WITH BREAKFAST TABLET, FILM COATED, EXTENDED RELEASE ORAL ONCE A DAY
Status: ACTIVE | COMMUNITY

## 2024-12-27 RX ORDER — GABAPENTIN 600 MG/1
1 TABLET TABLET, FILM COATED ORAL ONCE A DAY
Qty: 30 | Status: ACTIVE | COMMUNITY
Start: 2024-02-19

## 2024-12-27 RX ORDER — VALSARTAN AND HYDROCHLOROTHIAZIDE 320; 12.5 MG/1; MG/1
1 TABLET TABLET, FILM COATED ORAL ONCE A DAY
Status: ACTIVE | COMMUNITY

## 2025-02-24 ENCOUNTER — OFFICE VISIT (OUTPATIENT)
Dept: URBAN - METROPOLITAN AREA CLINIC 84 | Facility: CLINIC | Age: 80
End: 2025-02-24